# Patient Record
Sex: MALE | Race: WHITE | NOT HISPANIC OR LATINO | Employment: STUDENT | RURAL
[De-identification: names, ages, dates, MRNs, and addresses within clinical notes are randomized per-mention and may not be internally consistent; named-entity substitution may affect disease eponyms.]

---

## 2021-10-05 ENCOUNTER — CLINICAL SUPPORT (OUTPATIENT)
Dept: FAMILY MEDICINE | Facility: CLINIC | Age: 21
End: 2021-10-05

## 2021-10-05 DIAGNOSIS — Z11.1 SCREENING EXAMINATION FOR PULMONARY TUBERCULOSIS: Primary | ICD-10-CM

## 2021-10-05 PROCEDURE — 90471 IMMUNIZATION ADMIN: CPT | Mod: ,,, | Performed by: NURSE PRACTITIONER

## 2021-10-05 PROCEDURE — 86580 TB INTRADERMAL TEST: CPT | Mod: ,,, | Performed by: NURSE PRACTITIONER

## 2021-10-05 PROCEDURE — 90734 MENINGOCOCCAL CONJUGATE VACCINE 4-VALENT IM (MENACTRA): ICD-10-PCS | Mod: ,,, | Performed by: NURSE PRACTITIONER

## 2021-10-05 PROCEDURE — 86580 POCT TB SKIN TEST: ICD-10-PCS | Mod: ,,, | Performed by: NURSE PRACTITIONER

## 2021-10-05 PROCEDURE — 90734 MENACWYD/MENACWYCRM VACC IM: CPT | Mod: ,,, | Performed by: NURSE PRACTITIONER

## 2021-10-05 PROCEDURE — 90471 MENINGOCOCCAL CONJUGATE VACCINE 4-VALENT IM (MENACTRA): ICD-10-PCS | Mod: ,,, | Performed by: NURSE PRACTITIONER

## 2021-10-07 ENCOUNTER — CLINICAL SUPPORT (OUTPATIENT)
Dept: FAMILY MEDICINE | Facility: CLINIC | Age: 21
End: 2021-10-07

## 2021-10-07 DIAGNOSIS — Z11.1 SCREENING EXAMINATION FOR PULMONARY TUBERCULOSIS: Primary | ICD-10-CM

## 2021-10-07 LAB
TB INDURATION - 48 HR READ: 0 MM
TB INDURATION - 72 HR READ: NORMAL
TB SKIN TEST - 48 HR READ: NEGATIVE
TB SKIN TEST - 72 HR READ: NORMAL

## 2022-02-04 ENCOUNTER — OFFICE VISIT (OUTPATIENT)
Dept: FAMILY MEDICINE | Facility: CLINIC | Age: 22
End: 2022-02-04

## 2022-02-04 VITALS
TEMPERATURE: 98 F | WEIGHT: 155 LBS | HEART RATE: 96 BPM | HEIGHT: 72 IN | BODY MASS INDEX: 20.99 KG/M2 | RESPIRATION RATE: 18 BRPM | DIASTOLIC BLOOD PRESSURE: 80 MMHG | SYSTOLIC BLOOD PRESSURE: 123 MMHG

## 2022-02-04 DIAGNOSIS — U07.1 COVID-19: Primary | ICD-10-CM

## 2022-02-04 DIAGNOSIS — R05.9 COUGH: ICD-10-CM

## 2022-02-04 LAB
CTP QC/QA: YES
FLUAV AG NPH QL: NEGATIVE
FLUBV AG NPH QL: NEGATIVE
SARS-COV-2 AG RESP QL IA.RAPID: POSITIVE

## 2022-02-04 PROCEDURE — 87428 POCT SARS-COV2 (COVID) WITH FLU ANTIGEN: ICD-10-PCS | Mod: QW,,, | Performed by: NURSE PRACTITIONER

## 2022-02-04 PROCEDURE — RUSHUWA RUSH UWA STUDENT OFFICE VISIT: Mod: ,,, | Performed by: NURSE PRACTITIONER

## 2022-02-04 PROCEDURE — RUSHUWA RUSH UWA STUDENT OFFICE VISIT: ICD-10-PCS | Mod: ,,, | Performed by: NURSE PRACTITIONER

## 2022-02-04 PROCEDURE — 87428 SARSCOV & INF VIR A&B AG IA: CPT | Mod: QW,,, | Performed by: NURSE PRACTITIONER

## 2022-02-04 NOTE — PROGRESS NOTES
TATIANA Galo   [address] [place] [address]     PATIENT NAME: Yo Kent  : 2000  DATE: 22  MRN: [id number]      Billing Provider: TATIANA Galo  Level of Service: NM OFFICE/OUTPT VISIT, EST, LEVL III, 20-29 MIN  Patient PCP Information     Provider PCP Type    Clau Martinez DNP General          Reason for Visit / Chief Complaint: Fatigue, Headache, and Nasal Congestion       Update PCP  Update Chief Complaint         History of Present Illness / Problem Focused Workflow     Yo Kent presents to the clinic with Fatigue, Headache, and Nasal Congestion     HPI    Review of Systems     Review of Systems   HENT: Positive for nasal congestion and sinus pressure/congestion.         Medical / Social / Family History   History reviewed. No pertinent past medical history.    History reviewed. No pertinent surgical history.    Social History    reports that he has never smoked. He has never used smokeless tobacco. He reports previous alcohol use. He reports that he does not use drugs.    Family History  's family history is not on file.    Medications and Allergies     Medications  No outpatient medications have been marked as taking for the 22 encounter (Office Visit) with TATIANA Galo.       Allergies  Review of patient's allergies indicates:  No Known Allergies    Physical Examination   /80 (BP Location: Left arm, Patient Position: Sitting, BP Method: Large (Automatic))   Pulse 96   Temp 98.1 °F (36.7 °C) (Temporal)   Resp 18   Ht 6' (1.829 m)   Wt 70.3 kg (155 lb)   BMI 21.02 kg/m²   Physical Exam  Vitals and nursing note reviewed.   Constitutional:       Appearance: Normal appearance.   HENT:      Head: Normocephalic and atraumatic.      Right Ear: External ear normal.      Left Ear: External ear normal.      Nose: Congestion and rhinorrhea present.      Mouth/Throat:      Mouth: Mucous membranes are dry.      Pharynx: Oropharynx is  clear.   Eyes:      Pupils: Pupils are equal, round, and reactive to light.   Cardiovascular:      Rate and Rhythm: Normal rate and regular rhythm.      Pulses: Normal pulses.      Heart sounds: Normal heart sounds. No murmur heard.  No gallop.    Pulmonary:      Effort: Pulmonary effort is normal.      Breath sounds: Normal breath sounds. No wheezing or rales.   Abdominal:      General: Bowel sounds are normal. There is no distension.      Palpations: Abdomen is soft.      Tenderness: There is no abdominal tenderness.   Musculoskeletal:      Cervical back: Normal range of motion and neck supple.   Skin:     General: Skin is warm and dry.   Neurological:      General: No focal deficit present.      Mental Status: He is alert and oriented to person, place, and time.   Psychiatric:         Mood and Affect: Mood normal.         Behavior: Behavior normal.          Assessment and Plan (including Health Maintenance)      Problem List  [employer]  Document Outside HM   :    Plan:         Health Maintenance Due   Topic Date Due    Hepatitis C Screening  Never done    Lipid Panel  Never done    COVID-19 Vaccine (1) Never done    HPV Vaccines (1 - Male 2-dose series) Never done    HIV Screening  Never done    TETANUS VACCINE  Never done    Influenza Vaccine (1) Never done       Problem List Items Addressed This Visit        Other    COVID-19 - Primary      Other Visit Diagnoses     Cough        Relevant Orders    POCT SARS-COV2 (COVID) with Flu Antigen (Completed)          The patient has no Health Maintenance topics of status Not Due    Future Appointments   Date Time Provider Department Center   2/4/2022 10:05 AM TATIANA Galo [facility]        Follow up in about 3 months (around 5/4/2022), or if symptoms worsen or fail to improve.        Signature:  TATIANA Galo      [address] [place] [address]    Date of encounter: 2/4/22